# Patient Record
Sex: FEMALE | NOT HISPANIC OR LATINO | URBAN - METROPOLITAN AREA
[De-identification: names, ages, dates, MRNs, and addresses within clinical notes are randomized per-mention and may not be internally consistent; named-entity substitution may affect disease eponyms.]

---

## 2018-06-25 ENCOUNTER — IMPORTED ENCOUNTER (OUTPATIENT)
Dept: URBAN - METROPOLITAN AREA CLINIC 1 | Facility: CLINIC | Age: 63
End: 2018-06-25

## 2018-06-25 PROBLEM — H52.4: Noted: 2018-06-25

## 2018-06-25 PROBLEM — H52.03: Noted: 2018-06-25

## 2018-06-25 PROCEDURE — S0621 ROUTINE OPHTHALMOLOGICAL EXA: HCPCS

## 2018-06-25 NOTE — PATIENT DISCUSSION
1.  Hyperopia: Rx was given for corrective spectacles if indicated. 2.  Presbyopia: Rx was given for corrective spectacles if indicated. 3.  (Cataract OU: Observe for now without intervention. The patient was advised to contact us if any change or worsening of vision)4. (S/p Blepharoplasty ULs OU (Dr. Peter Guillermo in [place]. )Return for an appointment for 30/glakailey in 6 months with Dr. Rosita Machado.

## 2018-12-28 ENCOUNTER — IMPORTED ENCOUNTER (OUTPATIENT)
Dept: URBAN - METROPOLITAN AREA CLINIC 1 | Facility: CLINIC | Age: 63
End: 2018-12-28

## 2018-12-28 PROBLEM — H25.813: Noted: 2018-12-28

## 2018-12-28 PROCEDURE — 92015 DETERMINE REFRACTIVE STATE: CPT

## 2018-12-28 PROCEDURE — 92014 COMPRE OPH EXAM EST PT 1/>: CPT

## 2018-12-28 NOTE — PATIENT DISCUSSION
1.  Cataracts OU -- Visually Significant secondary to glare discussed the risks benefits alternatives and limitations of cataract surgery. The patient stated a full understanding and a desire to proceed with the procedure. The patient will need to return for preop appointment with cataract measurements and to have any additional questions answered and start pre-operative eye drops as directed. Not examined by [facility] today. Phaco PCL OS first then OD. Otherwise follow-up in 4 MOS for a 10 / [facility] / Tiny Rasmussen / Christy OU w/ RBF. 2. S/p Blepharoplasty (Dr. Warden Oppenheim in [facility]) -- [facility] 8 for next available 10 / [facility] / H and P appointment with Dr. Ely Dhillon.

## 2019-01-28 ENCOUNTER — IMPORTED ENCOUNTER (OUTPATIENT)
Dept: URBAN - METROPOLITAN AREA CLINIC 1 | Facility: CLINIC | Age: 64
End: 2019-01-28

## 2019-01-28 PROBLEM — H25.813: Noted: 2019-01-28

## 2019-01-28 PROCEDURE — 92136 OPHTHALMIC BIOMETRY: CPT

## 2019-01-28 PROCEDURE — 92012 INTRM OPH EXAM EST PATIENT: CPT

## 2019-01-28 NOTE — PATIENT DISCUSSION
1.  Cataracts OU -- Visually Significant secondary to glare discussed the risks benefits alternatives and limitations of cataract surgery. The patient stated a full understanding and a desire to proceed with the procedure. The patient will need to return for preop appointment with cataract measurements and to have any additional questions answered and start pre-operative eye drops as directed. Pt understands they will need glasses post-op to achieve their best corrected vision. Thoroughly discussed visual expectations post phaco with patient. Pt understood. Proceed w/ Phaco Standard PCL OSReturn as scheduled for Cataract Sx OS with Dr. Litzy Dorantes.

## 2019-02-06 ENCOUNTER — IMPORTED ENCOUNTER (OUTPATIENT)
Dept: URBAN - METROPOLITAN AREA CLINIC 1 | Facility: CLINIC | Age: 64
End: 2019-02-06

## 2019-02-06 PROBLEM — H25.812 COMBINED FORM OF SENILE CATARACT OF LEFT EYE: Status: RESOLVED | Noted: 2019-02-06 | Resolved: 2019-02-06

## 2019-02-06 PROBLEM — H25.812 COMBINED FORM OF SENILE CATARACT OF LEFT EYE: Status: ACTIVE | Noted: 2019-02-06

## 2019-02-07 ENCOUNTER — IMPORTED ENCOUNTER (OUTPATIENT)
Dept: URBAN - METROPOLITAN AREA CLINIC 1 | Facility: CLINIC | Age: 64
End: 2019-02-07

## 2019-02-07 PROBLEM — H25.811: Noted: 2019-02-07

## 2019-02-07 PROBLEM — Z96.1: Noted: 2019-02-07

## 2019-02-07 PROCEDURE — 92136 OPHTHALMIC BIOMETRY: CPT

## 2019-02-07 NOTE — PATIENT DISCUSSION
1.  Cataract OD: Visually Significant secondary to glare discussed the risks benefits alternatives and limitations of cataract surgery. The patient stated a full understanding and a desire to proceed with the procedure. Pt understands they will need glasses post-op to achieve their best corrected vision. Phaco PCL OD 2. POW#2  CE/IOL OS doing well.   F/u as scheduled 2nd eye

## 2019-02-07 NOTE — PATIENT DISCUSSION
1. POD#1 CE/IOL OS (Standard) doing well. Continue all 3 gtts as prescribed and until gone. Use Prednisolone BID OS Acular Qdaily OS Ocuflox TID OS Post op Warnings Reiterated RTC as scheduled 2. Cataract OD: Visually Significant secondary to glare discussed the risks benefits alternatives and limitations of cataract surgery. The patient stated a full understanding and a desire to proceed with the procedure. Pt understands they will need glasses post-op to achieve their best corrected vision.    Phaco PCL OD (Standard lens standard procedure) F/u as scheduled 2nd eye

## 2019-02-20 ENCOUNTER — IMPORTED ENCOUNTER (OUTPATIENT)
Dept: URBAN - METROPOLITAN AREA CLINIC 1 | Facility: CLINIC | Age: 64
End: 2019-02-20

## 2019-02-20 PROBLEM — H25.811 COMBINED FORM OF SENILE CATARACT OF RIGHT EYE: Status: ACTIVE | Noted: 2019-02-20

## 2019-02-20 PROBLEM — H25.811 COMBINED FORM OF SENILE CATARACT OF RIGHT EYE: Status: RESOLVED | Noted: 2019-02-20 | Resolved: 2019-02-20

## 2019-02-21 ENCOUNTER — IMPORTED ENCOUNTER (OUTPATIENT)
Dept: URBAN - METROPOLITAN AREA CLINIC 1 | Facility: CLINIC | Age: 64
End: 2019-02-21

## 2019-02-21 PROBLEM — Z96.1: Noted: 2019-02-21

## 2019-02-21 PROCEDURE — 99024 POSTOP FOLLOW-UP VISIT: CPT

## 2019-02-21 NOTE — PATIENT DISCUSSION
1. POD#1 Phaco/ PCL OD- (Standard) doing well. Continue all 3 gtts as prescribed and until gone. Use Prednisolone BID OD Acular Qdaily OD Ocuflox TID OD Post op Warnings Reiterated 2. POW#2 Phaco/ PCL OS- (Standard) doing well Continue all 3 gtts as prescribed and until gone. Prednisolone BID OD Acular Qdaily OD Ocuflox TID OD and Prednisolone BID OS Acular Qdaily OS Ocuflox TID OS Post op Warnings Reiterated RTC as scheduled

## 2019-03-14 ENCOUNTER — IMPORTED ENCOUNTER (OUTPATIENT)
Dept: URBAN - METROPOLITAN AREA CLINIC 1 | Facility: CLINIC | Age: 64
End: 2019-03-14

## 2019-03-14 PROBLEM — Z09: Noted: 2019-03-14

## 2019-03-14 PROCEDURE — 99024 POSTOP FOLLOW-UP VISIT: CPT

## 2019-03-14 NOTE — PATIENT DISCUSSION
POW#3 Phaco/ PCL OU (Standard) -- Doing well Finish PO meds per schedule MRX for glasses givenReturn for next available Single CTL Fit appointment (OS Near to [address]) with Dr. Ashley Gonzalez.

## 2020-12-01 NOTE — PATIENT DISCUSSION
POSTERIOR VITREOUS DETACHMENT, OS: NO RETINAL HOLES OR TEARS ON SLIT LAMP EXAM. RD PRECAUTIONS DISCUSSED. CALLBACK INSTRUCTIONS GIVEN. RETURN FOR FOLLOW-UP AS SCHEDULED.

## 2022-04-02 ASSESSMENT — TONOMETRY
OD_IOP_MMHG: 16
OS_IOP_MMHG: 16
OD_IOP_MMHG: 15
OD_IOP_MMHG: 16
OS_IOP_MMHG: 16
OS_IOP_MMHG: 15
OS_IOP_MMHG: 16
OD_IOP_MMHG: 16
OS_IOP_MMHG: 16
OD_IOP_MMHG: 15

## 2022-04-02 ASSESSMENT — VISUAL ACUITY
OS_SC: 20/25
OS_CC: J1
OD_CC: J1
OS_GLARE: 20/100
OD_CC: J1+
OD_SC: 20/25
OS_SC: 20/25+2
OD_SC: 20/25
OD_CC: 20/20
OD_SC: 20/20-1
OD_CC: 20/20
OS_CC: 20/20
OS_CC: J1
OS_SC: 20/25
OD_GLARE: 20/100
OS_CC: 20/20
OS_CC: J1+
OS_GLARE: 20/100
OD_GLARE: 20/100
OS_CC: 20/20
OD_CC: J1

## 2022-10-18 ENCOUNTER — HOSPITAL ENCOUNTER (OUTPATIENT)
Dept: LAB | Age: 67
Discharge: HOME | End: 2022-10-18
Payer: COMMERCIAL

## 2022-10-18 PROCEDURE — 88305 TISSUE EXAM BY PATHOLOGIST: CPT

## 2022-12-13 NOTE — PATIENT DISCUSSION
Posterior capsular opacity accounts for the patient's complaints and is interfering with activities of daily living. Discussed risks and benefits of YAG Laser Capsulotomy. Patient wishes to proceed. Schedule YAG Laser Capsulotomy in the left then right eye.

## 2024-03-12 ENCOUNTER — COMPREHENSIVE EXAM (OUTPATIENT)
Dept: URBAN - METROPOLITAN AREA CLINIC 1 | Facility: CLINIC | Age: 69
End: 2024-03-12

## 2024-03-12 DIAGNOSIS — Z96.1: ICD-10-CM

## 2024-03-12 DIAGNOSIS — H35.342: ICD-10-CM

## 2024-03-12 DIAGNOSIS — H04.123: ICD-10-CM

## 2024-03-12 DIAGNOSIS — H16.143: ICD-10-CM

## 2024-03-12 DIAGNOSIS — H26.493: ICD-10-CM

## 2024-03-12 PROCEDURE — 92014 COMPRE OPH EXAM EST PT 1/>: CPT

## 2024-03-12 PROCEDURE — 92134 CPTRZ OPH DX IMG PST SGM RTA: CPT

## 2024-03-12 ASSESSMENT — VISUAL ACUITY
OS_SC: 20/100-2
OD_SC: 20/20

## 2024-03-12 ASSESSMENT — TONOMETRY
OD_IOP_MMHG: 14
OS_IOP_MMHG: 14

## 2024-04-05 ENCOUNTER — HOSPITAL ENCOUNTER (OUTPATIENT)
Facility: HOSPITAL | Age: 69
Setting detail: SPECIMEN
Discharge: HOME | End: 2024-04-08
Payer: COMMERCIAL

## 2024-04-05 PROCEDURE — 88305 TISSUE EXAM BY PATHOLOGIST: CPT

## 2024-08-18 NOTE — PATIENT DISCUSSION
It was discussed with the patient the importance of good control of their blood sugar, blood pressure, cholesterol, diet, exercise and weight under the guidance of their diabetic doctor to prevent/halt diabetic retinopathy. Statement Selected

## 2024-11-26 ENCOUNTER — FOLLOW UP (OUTPATIENT)
Dept: URBAN - METROPOLITAN AREA CLINIC 1 | Facility: CLINIC | Age: 69
End: 2024-11-26

## 2024-11-26 DIAGNOSIS — Z96.1: ICD-10-CM

## 2024-11-26 DIAGNOSIS — H26.493: ICD-10-CM

## 2024-11-26 PROCEDURE — 92012 INTRM OPH EXAM EST PATIENT: CPT

## 2024-11-26 PROCEDURE — 92015 DETERMINE REFRACTIVE STATE: CPT

## 2024-12-13 ENCOUNTER — CLINIC PROCEDURE ONLY (OUTPATIENT)
Age: 69
End: 2024-12-13

## 2024-12-13 DIAGNOSIS — H26.493: ICD-10-CM

## 2024-12-13 DIAGNOSIS — Z96.1: ICD-10-CM

## 2024-12-13 PROCEDURE — 66821 AFTER CATARACT LASER SURGERY: CPT

## 2025-01-10 ENCOUNTER — CLINIC PROCEDURE ONLY (OUTPATIENT)
Age: 70
End: 2025-01-10

## 2025-01-10 DIAGNOSIS — H26.492: ICD-10-CM

## 2025-01-10 DIAGNOSIS — Z96.1: ICD-10-CM

## 2025-01-10 PROCEDURE — 66821 AFTER CATARACT LASER SURGERY: CPT | Mod: 79,LT

## 2025-02-05 ENCOUNTER — DIAGNOSTICS ONLY (OUTPATIENT)
Age: 70
End: 2025-02-05

## 2025-02-05 DIAGNOSIS — H26.493: ICD-10-CM

## 2025-02-05 DIAGNOSIS — Z96.1: ICD-10-CM

## 2025-02-05 PROCEDURE — 92015 DETERMINE REFRACTIVE STATE: CPT | Mod: NC

## 2025-03-21 ENCOUNTER — HOSPITAL ENCOUNTER (OUTPATIENT)
Facility: HOSPITAL | Age: 70
Setting detail: SPECIMEN
Discharge: HOME | End: 2025-03-24
Payer: COMMERCIAL

## 2025-03-21 PROCEDURE — 88331 PATH CONSLTJ SURG 1 BLK 1SPC: CPT

## 2025-03-21 PROCEDURE — 88305 TISSUE EXAM BY PATHOLOGIST: CPT

## 2025-06-06 ENCOUNTER — HOSPITAL ENCOUNTER (OUTPATIENT)
Facility: HOSPITAL | Age: 70
Setting detail: SPECIMEN
Discharge: HOME | End: 2025-06-09
Payer: COMMERCIAL

## 2025-06-06 PROCEDURE — 88305 TISSUE EXAM BY PATHOLOGIST: CPT
